# Patient Record
Sex: FEMALE | Race: AMERICAN INDIAN OR ALASKA NATIVE
[De-identification: names, ages, dates, MRNs, and addresses within clinical notes are randomized per-mention and may not be internally consistent; named-entity substitution may affect disease eponyms.]

---

## 2019-08-05 ENCOUNTER — HOSPITAL ENCOUNTER (OUTPATIENT)
Dept: HOSPITAL 5 - LAB | Age: 49
Discharge: HOME | End: 2019-08-05
Attending: SPECIALIST
Payer: COMMERCIAL

## 2019-08-05 DIAGNOSIS — G43.711: Primary | ICD-10-CM

## 2019-08-05 PROCEDURE — 82607 VITAMIN B-12: CPT

## 2019-08-05 PROCEDURE — 36415 COLL VENOUS BLD VENIPUNCTURE: CPT

## 2021-05-18 ENCOUNTER — HOSPITAL ENCOUNTER (EMERGENCY)
Dept: HOSPITAL 5 - ED | Age: 51
LOS: 1 days | Discharge: HOME | End: 2021-05-19
Payer: COMMERCIAL

## 2021-05-18 DIAGNOSIS — I10: ICD-10-CM

## 2021-05-18 DIAGNOSIS — M79.604: Primary | ICD-10-CM

## 2021-05-18 DIAGNOSIS — M79.605: ICD-10-CM

## 2021-05-18 DIAGNOSIS — M79.89: ICD-10-CM

## 2021-05-18 DIAGNOSIS — Z91.041: ICD-10-CM

## 2021-05-18 DIAGNOSIS — Z79.899: ICD-10-CM

## 2021-05-18 PROCEDURE — 93970 EXTREMITY STUDY: CPT

## 2021-05-19 VITALS — DIASTOLIC BLOOD PRESSURE: 75 MMHG | SYSTOLIC BLOOD PRESSURE: 136 MMHG

## 2021-05-19 NOTE — VASCULAR LAB REPORT
DUPLEX DOPPLER LOWER EXTREMITY VEINS, BILATERAL



INDICATION / CLINICAL INFORMATION:

Evaluate for DVT.



TECHNIQUE:

Duplex doppler imaging was performed through the veins of both lower extremities using venous bonita
taco and other maneuvers.



COMPARISON: 

None available.



FINDINGS:

RIGHT COMMON FEMORAL VEIN: Negative.

RIGHT FEMORAL VEIN: Negative.

RIGHT POPLITEAL VEIN: Negative.

RIGHT CALF VEINS: Negative.



LEFT COMMON FEMORAL VEIN: Negative.

LEFT FEMORAL VEIN: Negative.

LEFT POPLITEAL VEIN: Negative.

LEFT CALF VEINS: Negative.



ADDITIONAL FINDINGS: None.



IMPRESSION:

1. No sonographic evidence for DVT in either lower extremity.



Signer Name: Yannick Stoddard MD 

Signed: 5/19/2021 1:45 AM

Workstation Name: [id number]

## 2021-05-19 NOTE — EMERGENCY DEPARTMENT REPORT
ED General Adult HPI





- General


Chief complaint: Recheck/Abnormal Lab/Rx


Stated complaint: SWELLING OF LEGS AFTER SHARP PAIN


Source: patient


Mode of arrival: Ambulatory


Limitations: No Limitations





- History of Present Illness


Initial comments: 





Patient is 51 years old female with history of hypertension.  Patient [place] 

[place] complaining of bilateral lower extremity swelling.  

Patient stated that she had a long flight.  Patient denied any chest pain or 

shortness of breath.  No fever or chills.





- Related Data


                                    Allergies











Allergy/AdvReac Type Severity Reaction Status Date / Time


 


iodine Allergy  Unknown Verified 05/19/21 00:00














ED Review of Systems


ROS: 


Stated complaint: SWELLING OF LEGS AFTER SHARP PAIN


Other details as noted in HPI





Comment: All other systems reviewed and negative


Constitutional: denies: chills, fever


Respiratory: denies: cough, shortness of breath


Cardiovascular: denies: chest pain, palpitations


Gastrointestinal: denies: abdominal pain, nausea


Musculoskeletal: denies: back pain


Neurological: denies: headache





ED Past Medical Hx





- Past Medical History


Previous Medical History?: Yes


Hx Hypertension: Yes





- Social History


Smoking Status: Never Smoker





ED Physical Exam





- General


Limitations: No Limitations


General appearance: alert, in no apparent distress





- Head


Head exam: Present: atraumatic, normocephalic, normal inspection





- Eye


Eye exam: Present: normal appearance, PERRL





- ENT


ENT exam: Present: normal exam, normal orophraynx, mucous membranes moist





- Neck


Neck exam: Present: normal inspection, full ROM.  Absent: tenderness, 

meningismus





- Respiratory


Respiratory exam: Present: normal lung sounds bilaterally





- Cardiovascular


Cardiovascular Exam: Present: regular rate, normal rhythm, normal heart sounds





- GI/Abdominal


GI/Abdominal exam: Present: soft, normal bowel sounds.  Absent: distended, 

tenderness, guarding, rebound, rigid, organomegaly, mass, bruit, pulsatile mass,

hernia





- Extremities Exam


Extremities exam: Present: pedal edema





- Back Exam


Back exam: Present: normal inspection, full ROM.  Absent: CVA tenderness (R), 

CVA tenderness (L)





- Neurological Exam


Neurological exam: Present: alert, oriented X3, CN II-XII intact, normal gait, 

reflexes normal.  Absent: motor sensory deficit





- Psychiatric


Psychiatric exam: Present: normal mood





- Skin


Skin exam: Present: warm, intact, normal color





ED Course





                                   Vital Signs











  05/18/21 05/19/21





  23:58 03:31


 


Temperature 98.4 F 


 


Pulse Rate 85 66


 


Respiratory 16 19





Rate  


 


Blood Pressure 157/81 136/75


 


O2 Sat by Pulse 99 100





Oximetry  














ED Medical Decision Making





- Radiology Data


Radiology results: report reviewed





- Medical Decision Making





Patient is 51 years old female with history of hypertension.  Patient presented 

to the ER from the airport complaining of bilateral lower extremity swelling.  

Patient stated that she had a long flight.  Patient denied any chest pain or 

shortness of breath.  No fever or chills.





Bilateral lower extremity exam showed mild peripheral edema, no calf tenderness 

with a negative Reema sign.  Bilateral lower extremity Doppler ultrasound is 

negative for DVT.  Patient advised to follow-up with her primary care physician 

in the next 2 to 3 days and to return to the ER if she develop any new symptoms.


Critical care attestation.: 


If time is entered above; I have spent that time in minutes in the direct care 

of this critically ill patient, excluding procedure time.








ED Disposition


Clinical Impression: 


 Leg pain, Peripheral edema





Disposition: DC-01 TO HOME OR SELFCARE


Is pt being admited?: No


Condition: Stable


Instructions:  Edema, Easy-to-Read


Referrals: 


PRIMARY CARE,MD [Referring] - 3-5 Days